# Patient Record
Sex: FEMALE | Race: WHITE | NOT HISPANIC OR LATINO | Employment: FULL TIME | URBAN - METROPOLITAN AREA
[De-identification: names, ages, dates, MRNs, and addresses within clinical notes are randomized per-mention and may not be internally consistent; named-entity substitution may affect disease eponyms.]

---

## 2020-08-21 ENCOUNTER — TRANSFERRED RECORDS (OUTPATIENT)
Dept: MULTI SPECIALTY CLINIC | Facility: CLINIC | Age: 45
End: 2020-08-21

## 2020-08-21 LAB
HPV ABSTRACT: NORMAL
PAP-ABSTRACT: NORMAL

## 2021-10-04 ENCOUNTER — ANCILLARY PROCEDURE (OUTPATIENT)
Dept: MAMMOGRAPHY | Facility: CLINIC | Age: 46
End: 2021-10-04
Attending: FAMILY MEDICINE
Payer: COMMERCIAL

## 2021-10-04 DIAGNOSIS — Z12.31 SCREENING MAMMOGRAM, ENCOUNTER FOR: ICD-10-CM

## 2021-10-04 PROCEDURE — 77063 BREAST TOMOSYNTHESIS BI: CPT | Mod: TC | Performed by: RADIOLOGY

## 2021-10-04 PROCEDURE — 77067 SCR MAMMO BI INCL CAD: CPT | Mod: TC | Performed by: RADIOLOGY

## 2021-10-08 ENCOUNTER — ANCILLARY PROCEDURE (OUTPATIENT)
Dept: MAMMOGRAPHY | Facility: CLINIC | Age: 46
End: 2021-10-08
Attending: FAMILY MEDICINE
Payer: COMMERCIAL

## 2021-10-08 DIAGNOSIS — N64.89 BREAST ASYMMETRY: ICD-10-CM

## 2021-10-08 PROCEDURE — 77065 DX MAMMO INCL CAD UNI: CPT | Mod: RT

## 2021-10-11 ENCOUNTER — ANCILLARY PROCEDURE (OUTPATIENT)
Dept: MAMMOGRAPHY | Facility: CLINIC | Age: 46
End: 2021-10-11
Attending: FAMILY MEDICINE
Payer: COMMERCIAL

## 2021-10-11 DIAGNOSIS — N64.89 BREAST ASYMMETRY: ICD-10-CM

## 2021-10-11 PROCEDURE — 272N000715 MA STEREOTACTIC BREAST BIOPSY VACUUM RT

## 2021-10-11 PROCEDURE — 250N000009 HC RX 250: Performed by: FAMILY MEDICINE

## 2021-10-11 PROCEDURE — 88342 IMHCHEM/IMCYTCHM 1ST ANTB: CPT | Mod: TC,XU | Performed by: FAMILY MEDICINE

## 2021-10-11 PROCEDURE — 88360 TUMOR IMMUNOHISTOCHEM/MANUAL: CPT | Mod: TC | Performed by: FAMILY MEDICINE

## 2021-10-11 RX ORDER — LIDOCAINE HYDROCHLORIDE AND EPINEPHRINE 10; 10 MG/ML; UG/ML
10 INJECTION, SOLUTION INFILTRATION; PERINEURAL ONCE
Status: COMPLETED | OUTPATIENT
Start: 2021-10-11 | End: 2021-10-11

## 2021-10-11 RX ADMIN — LIDOCAINE HYDROCHLORIDE,EPINEPHRINE BITARTRATE 10 ML: 10; .01 INJECTION, SOLUTION INFILTRATION; PERINEURAL at 14:23

## 2021-10-11 RX ADMIN — LIDOCAINE HYDROCHLORIDE 10 ML: 10 INJECTION, SOLUTION INFILTRATION; PERINEURAL at 14:22

## 2021-10-13 LAB
PATH REPORT.COMMENTS IMP SPEC: NORMAL
PATH REPORT.FINAL DX SPEC: NORMAL
PATH REPORT.GROSS SPEC: NORMAL
PATH REPORT.MICROSCOPIC SPEC OTHER STN: NORMAL
PATH REPORT.MICROSCOPIC SPEC OTHER STN: NORMAL
PATH REPORT.RELEVANT HX SPEC: NORMAL
PHOTO IMAGE: NORMAL

## 2021-10-13 PROCEDURE — 88342 IMHCHEM/IMCYTCHM 1ST ANTB: CPT | Mod: 26 | Performed by: PATHOLOGY

## 2021-10-13 PROCEDURE — 88360 TUMOR IMMUNOHISTOCHEM/MANUAL: CPT | Mod: 26 | Performed by: PATHOLOGY

## 2021-10-13 PROCEDURE — 88305 TISSUE EXAM BY PATHOLOGIST: CPT | Mod: 26 | Performed by: PATHOLOGY

## 2021-10-14 ENCOUNTER — TELEPHONE (OUTPATIENT)
Dept: MAMMOGRAPHY | Facility: CLINIC | Age: 46
End: 2021-10-14

## 2021-10-14 NOTE — TELEPHONE ENCOUNTER
Pathology report reviewed with our breast radiologist, Dr. Gallegos, who confirmed the recent breast imaging is concordant with the final pathology results below.    I phoned patient, confirmed her full name, date of birth, and informed patient of her benign breast biopsy results.     Recommended follow up per Radiologist is to return to routine breast screening.      Patient reports no problems or concerns with her biopsy site.     Questions were answered and my phone number given if she has further questions or concerns.  I informed patient I will notify the ordering provider of the results and recommendations for follow up.  Patient verbalized understanding and agrees with the plan of care.     Lizzy Man, RN, BSN  Breast Care Nurse Coordinator  [facility]  [phone]

## 2021-10-15 ENCOUNTER — TELEPHONE (OUTPATIENT)
Dept: FAMILY MEDICINE | Facility: CLINIC | Age: 46
End: 2021-10-15

## 2021-10-15 NOTE — TELEPHONE ENCOUNTER
----- Message from Nela Patel DO sent at 10/13/2021  1:41 PM CDT -----  Please call Marcela.   Her breast biopsy results have returned and show benign changes.  Nela Patel DO

## 2021-10-19 ENCOUNTER — OFFICE VISIT (OUTPATIENT)
Dept: FAMILY MEDICINE | Facility: CLINIC | Age: 46
End: 2021-10-19
Payer: COMMERCIAL

## 2021-10-19 VITALS
SYSTOLIC BLOOD PRESSURE: 127 MMHG | HEIGHT: 66 IN | BODY MASS INDEX: 22.86 KG/M2 | DIASTOLIC BLOOD PRESSURE: 73 MMHG | HEART RATE: 67 BPM | WEIGHT: 142.25 LBS

## 2021-10-19 DIAGNOSIS — M54.2 NECK PAIN: ICD-10-CM

## 2021-10-19 DIAGNOSIS — Z23 IMMUNIZATION DUE: ICD-10-CM

## 2021-10-19 DIAGNOSIS — Z76.89 ENCOUNTER TO ESTABLISH CARE: Primary | ICD-10-CM

## 2021-10-19 PROCEDURE — 90686 IIV4 VACC NO PRSV 0.5 ML IM: CPT | Performed by: FAMILY MEDICINE

## 2021-10-19 PROCEDURE — 90471 IMMUNIZATION ADMIN: CPT | Performed by: FAMILY MEDICINE

## 2021-10-19 PROCEDURE — 99213 OFFICE O/P EST LOW 20 MIN: CPT | Mod: 25 | Performed by: FAMILY MEDICINE

## 2021-10-19 ASSESSMENT — MIFFLIN-ST. JEOR: SCORE: 1300.96

## 2021-10-19 NOTE — PROGRESS NOTES
"    Assessment & Plan     1. Encounter to establish care  Reviewed health history and health maintenance recommendations.   She will check her home records for tetanus booster dates, may schedule nurse only visit if due or unable to find records.     2. Neck pain  Recommend tylenol, NSAIDS, warm bath/warm pad, gentle massage and stretching    3. Immunization due  - INFLUENZA VACCINE IM >6 MO VALENT IIV4 (ALFURIA/FLUZONE)     Return in about 1 year (around 10/19/2022) for Physical Exam.    Nela Patel DO  [facility]    Subjective   Marcela is a 46 year old who presents for the following health issues     Chief Complaint   Patient presents with     Establish Care      HPI   Marcela presents today to establish care. Recent change of insurance, needs new PCP.    Is remodeling house, was stressed, woke up with neck pain. Slightly decreased range of motion. Pain bilateral shoulders and radiating up into neck. No numbness or weakness. Symptoms are not radiating down arms. Hasn't taken anything for pain.    No other concerns. Healthy. Up to date on pap, mammo, and lipid screening.    Review of Systems   10 point ROS negative except for as reported above.       Objective    /73   Pulse 67   Ht 1.683 m (5' 6.25\")   Wt 64.5 kg (142 lb 4 oz)   LMP 10/12/2021 (Approximate)   Breastfeeding No   BMI 22.79 kg/m    Body mass index is 22.79 kg/m .  Physical Exam   GENERAL: Marcela is a pleasant, well appearing female, in no acute distress.  HEENT: Sclera white, no cervical lymphadenopathy  HEART: Regular rate and rhythm, no murmurs.  LUNGS: Clear to auscultation bilaterally, unlabored.  ABDOMEN: Soft, non-tender to palpation. No palpable masses.  EXTREMITIES: No lower extremity edema, pulses intact.         "

## 2021-10-19 NOTE — Clinical Note
Please abstract the following data from this visit with this patient into the appropriate field in Epic:    Tests that can be patient reported without a hard copy:    Pap smear done on this date: 8/21/20 (approximately), by this group: [facility], results were Negative.     Other Tests found in the patient's chart through Chart Review/Care Everywhere:    HPV done by this group [facility] on this date: Negative    Note to Abstraction: If this section is blank, no results were found via Chart Review/Care Everywhere.

## 2022-03-13 ENCOUNTER — HEALTH MAINTENANCE LETTER (OUTPATIENT)
Age: 47
End: 2022-03-13

## 2022-11-15 ASSESSMENT — ENCOUNTER SYMPTOMS
CHILLS: 0
HEARTBURN: 0
PALPITATIONS: 0
DIARRHEA: 0
PARESTHESIAS: 0
HEMATURIA: 0
FEVER: 0
BREAST MASS: 0
HEMATOCHEZIA: 0
EYE PAIN: 0
NERVOUS/ANXIOUS: 0
SORE THROAT: 0
ABDOMINAL PAIN: 0
DYSURIA: 0
FREQUENCY: 0
SHORTNESS OF BREATH: 0
DIZZINESS: 0
HEADACHES: 0
WEAKNESS: 0
CONSTIPATION: 0
COUGH: 0
ARTHRALGIAS: 0
JOINT SWELLING: 0
MYALGIAS: 0
NAUSEA: 0

## 2022-11-18 ENCOUNTER — OFFICE VISIT (OUTPATIENT)
Dept: FAMILY MEDICINE | Facility: CLINIC | Age: 47
End: 2022-11-18
Payer: COMMERCIAL

## 2022-11-18 VITALS
HEART RATE: 68 BPM | TEMPERATURE: 98.2 F | BODY MASS INDEX: 22.44 KG/M2 | OXYGEN SATURATION: 100 % | HEIGHT: 67 IN | DIASTOLIC BLOOD PRESSURE: 77 MMHG | WEIGHT: 143 LBS | SYSTOLIC BLOOD PRESSURE: 117 MMHG | RESPIRATION RATE: 16 BRPM

## 2022-11-18 DIAGNOSIS — Z71.89 ADVANCE DIRECTIVE DISCUSSED WITH PATIENT: ICD-10-CM

## 2022-11-18 DIAGNOSIS — Z00.00 ROUTINE HISTORY AND PHYSICAL EXAMINATION OF ADULT: Primary | ICD-10-CM

## 2022-11-18 DIAGNOSIS — Z23 NEED FOR DIPHTHERIA-TETANUS-PERTUSSIS (TDAP) VACCINE: ICD-10-CM

## 2022-11-18 DIAGNOSIS — Z23 NEEDS FLU SHOT: ICD-10-CM

## 2022-11-18 DIAGNOSIS — Z12.11 SCREEN FOR COLON CANCER: ICD-10-CM

## 2022-11-18 DIAGNOSIS — Z23 NEED FOR COVID-19 VACCINE: ICD-10-CM

## 2022-11-18 PROCEDURE — 91313 COVID-19,PF,MODERNA BIVALENT: CPT | Performed by: STUDENT IN AN ORGANIZED HEALTH CARE EDUCATION/TRAINING PROGRAM

## 2022-11-18 PROCEDURE — 0134A COVID-19,PF,MODERNA BIVALENT: CPT | Performed by: STUDENT IN AN ORGANIZED HEALTH CARE EDUCATION/TRAINING PROGRAM

## 2022-11-18 PROCEDURE — 99396 PREV VISIT EST AGE 40-64: CPT | Mod: 25 | Performed by: STUDENT IN AN ORGANIZED HEALTH CARE EDUCATION/TRAINING PROGRAM

## 2022-11-18 PROCEDURE — 90471 IMMUNIZATION ADMIN: CPT | Performed by: STUDENT IN AN ORGANIZED HEALTH CARE EDUCATION/TRAINING PROGRAM

## 2022-11-18 PROCEDURE — 90472 IMMUNIZATION ADMIN EACH ADD: CPT | Performed by: STUDENT IN AN ORGANIZED HEALTH CARE EDUCATION/TRAINING PROGRAM

## 2022-11-18 PROCEDURE — 90715 TDAP VACCINE 7 YRS/> IM: CPT | Performed by: STUDENT IN AN ORGANIZED HEALTH CARE EDUCATION/TRAINING PROGRAM

## 2022-11-18 PROCEDURE — 90682 RIV4 VACC RECOMBINANT DNA IM: CPT | Performed by: STUDENT IN AN ORGANIZED HEALTH CARE EDUCATION/TRAINING PROGRAM

## 2022-11-18 ASSESSMENT — ENCOUNTER SYMPTOMS
PARESTHESIAS: 0
HEARTBURN: 0
BREAST MASS: 0
SHORTNESS OF BREATH: 0
CHILLS: 0
HEMATOCHEZIA: 0
DIZZINESS: 0
PALPITATIONS: 0
JOINT SWELLING: 0
EYE PAIN: 0
ABDOMINAL PAIN: 0
MYALGIAS: 0
HEMATURIA: 0
WEAKNESS: 0
FEVER: 0
CONSTIPATION: 0
COUGH: 0
ARTHRALGIAS: 0
NAUSEA: 0
FREQUENCY: 0
HEADACHES: 0
SORE THROAT: 0
DYSURIA: 0
DIARRHEA: 0
NERVOUS/ANXIOUS: 0

## 2022-11-18 NOTE — PROGRESS NOTES
SUBJECTIVE:   CC: Marcela is an 47 year old who presents for preventive health visit.   Patient has been advised of split billing requirements and indicates understanding: Yes  Contraception  Pertinent negatives include no abdominal pain, arthralgias, chest pain, chills, congestion, coughing, fever, headaches, joint swelling, myalgias, nausea, rash, sore throat or weakness.   Healthy Habits:     Getting at least 3 servings of Calcium per day:  Yes    Bi-annual eye exam:  Yes    Dental care twice a year:  NO    Sleep apnea or symptoms of sleep apnea:  None    Diet:  Regular (no restrictions)    Frequency of exercise:  4-5 days/week    Duration of exercise:  30-45 minutes    Taking medications regularly:  Yes    Medication side effects:  None    PHQ-2 Total Score: 0    Additional concerns today:  Yes        Today's PHQ-2 Score:   PHQ-2 ( 1999 Pfizer) 11/15/2022   Q1: Little interest or pleasure in doing things 0   Q2: Feeling down, depressed or hopeless 0   PHQ-2 Score 0   PHQ-2 Total Score (12-17 Years)- Positive if 3 or more points; Administer PHQ-A if positive -   Q1: Little interest or pleasure in doing things Not at all   Q2: Feeling down, depressed or hopeless Not at all   PHQ-2 Score 0       Have you ever done Advance Care Planning? (For example, a Health Directive, POLST, or a discussion with a medical provider or your loved ones about your wishes): No, advance care planning information given to patient to review.  Advanced care planning was discussed at today's visit.    Social History     Tobacco Use     Smoking status: Never     Smokeless tobacco: Never   Substance Use Topics     Alcohol use: Yes     If you drink alcohol do you typically have >3 drinks per day or >7 drinks per week? Yes      Reviewed orders with patient.  Reviewed health maintenance and updated orders accordingly - Yes  Lab work is in process  Labs reviewed in EPIC    Breast Cancer Screening:    FHS-7:   Breast CA Risk Assessment  (FHS-7) 10/4/2021 10/8/2021 11/15/2022 11/23/2022   Did any of your first-degree relatives have breast or ovarian cancer? No No No No   Did any of your relatives have bilateral breast cancer? No No No No   Did any man in your family have breast cancer? No No No No   Did any woman in your family have breast and ovarian cancer? No No Yes No   Did any woman in your family have breast cancer before age 50 y? No No Yes No   Do you have 2 or more relatives with breast and/or ovarian cancer? No No Yes No   Do you have 2 or more relatives with breast and/or bowel cancer? No No No No       Pertinent mammograms are reviewed under the imaging tab.  Patient has mammogram scheduled for 11/23/2022.    History of abnormal Pap smear: NO - age 30-65 PAP every 5 years with negative HPV co-testing recommended  Most recent Pap 8/21/2020 negative with negative HPV cotesting.  Next due 8/21/2025.     Reviewed and updated as needed this visit by clinical staff    Allergies  Meds              Reviewed and updated as needed this visit by Provider                     Review of Systems   Constitutional: Negative for chills and fever.   HENT: Negative for congestion, ear pain, hearing loss and sore throat.    Eyes: Negative for pain and visual disturbance.   Respiratory: Negative for cough and shortness of breath.    Cardiovascular: Negative for chest pain, palpitations and peripheral edema.   Gastrointestinal: Negative for abdominal pain, constipation, diarrhea, heartburn, hematochezia and nausea.   Breasts:  Negative for tenderness, breast mass and discharge.   Genitourinary: Positive for vaginal discharge. Negative for dysuria, frequency, genital sores, hematuria, pelvic pain, urgency and vaginal bleeding.   Musculoskeletal: Negative for arthralgias, joint swelling and myalgias.   Skin: Negative for rash.   Neurological: Negative for dizziness, weakness, headaches and paresthesias.   Psychiatric/Behavioral: Negative for mood changes. The  "patient is not nervous/anxious.           OBJECTIVE:   /77 (BP Location: Left arm, Patient Position: Sitting, Cuff Size: Adult Regular)   Pulse 68   Temp 98.2  F (36.8  C) (Temporal)   Resp 16   Ht 1.702 m (5' 7\")   Wt 64.9 kg (143 lb)   LMP 11/02/2022   SpO2 100%   BMI 22.40 kg/m    Physical Exam  General Appearance:  Alert, cooperative, no distress, appears stated age.  Head:  Normocephalic, without obvious abnormality, atraumatic.  Eyes:  Conjunctivae/corneas clear, extraocular movements intact both eyes.  Lungs:  Clear to auscultation bilaterally, respirations unlabored.  Heart:  Regular rate and rhythm, S1 and S2 normal, no murmur, rub or gallop.  Abdomen:  Soft, non-tender, bowel sounds active all four quadrants.   Extremities:  Atraumatic, no cyanosis or edema.  Skin:  Skin color, texture, turgor normal, no rashes or lesions.  Neurologic: No focal deficits.      ASSESSMENT/PLAN:   Marcela was seen today for contraception and physical.    Diagnoses and all orders for this visit:    Routine history and physical examination of adult  -     REVIEW OF HEALTH MAINTENANCE PROTOCOL ORDERS    Screen for colon cancer  -     Colonoscopy Screening  Referral; Future    Need for diphtheria-tetanus-pertussis (Tdap) vaccine  -     TDAP VACCINE (Adacel, Boostrix)    Advance directive discussed with patient    Need for COVID-19 vaccine  -     COVID-19,PF,MODERNA BIVALENT (18+YRS)    Needs flu shot  -     [place] RIV4 (FLUBLOK) VACCINE RECOMBINANT DNA PRSRV ANTIBIO FREE, IM        Patient has been advised of split billing requirements and indicates understanding: Yes    Health Maintenance:    Depression screening: negative    STD Screening: declined    Contraception/folic acid (400-800mcg/day): provided info. Will think about it.     Cervical cancer screening (q3 years with cervical cytology alone 21-29. For women aged 30 to 65 years, sq3 years with cervical cytology alone, q5 years with high-risk human " papillomavirus (hrHPV) testing alone, or every 5 years with hrHPV testing in combination with cytology (cotesting): up to date. Last done 20 pap with cotest normal. Next Due 25.     Breast cancer screening ( biennial screening mammography for women aged 50 to 74 years): Positive family history as noted above. Declined genetic counseling.     Immunizations: flu and Tdap    HIV screening 15-65 (USPSTF guidelines, CDC guidelines are 13-64):    Hep C screening 18-78yo (USPSTF): declined    Advance Directive:  not on file. Information provided 2022.    Colorectal Cancer Screening (colonoscopy q10 years or q3 years 45-76yo)-  No family hx. Routine screening. Colonoscopy ordered.       COUNSELING:  Reviewed preventive health counseling, as reflected in patient instructions       Regular exercise       Healthy diet/nutrition       Vision screening       Hearing screening       Colorectal Cancer Screening       Consider Hep C screening for all patients one time for ages 18-79 years       HIV screeninx in teen years, 1x in adult years, and at intervals if high risk       Advance Care Planning        She reports that she has never smoked. She has never used smokeless tobacco.    Barbara Fuentes MD  [facility]

## 2022-11-23 ENCOUNTER — ANCILLARY PROCEDURE (OUTPATIENT)
Dept: MAMMOGRAPHY | Facility: CLINIC | Age: 47
End: 2022-11-23
Attending: FAMILY MEDICINE
Payer: COMMERCIAL

## 2022-11-23 DIAGNOSIS — Z12.31 VISIT FOR SCREENING MAMMOGRAM: ICD-10-CM

## 2022-11-23 PROCEDURE — 77067 SCR MAMMO BI INCL CAD: CPT

## 2022-12-23 ENCOUNTER — MYC MEDICAL ADVICE (OUTPATIENT)
Dept: FAMILY MEDICINE | Facility: CLINIC | Age: 47
End: 2022-12-23

## 2022-12-28 NOTE — TELEPHONE ENCOUNTER
The appt on the 5th was taken, offered the earlier time on the 6th and she will stick with the one on the 6th at the other clinic-[facility]

## 2023-01-06 ENCOUNTER — TELEPHONE (OUTPATIENT)
Dept: FAMILY MEDICINE | Facility: CLINIC | Age: 48
End: 2023-01-06

## 2023-01-06 ENCOUNTER — OFFICE VISIT (OUTPATIENT)
Dept: FAMILY MEDICINE | Facility: CLINIC | Age: 48
End: 2023-01-06
Payer: COMMERCIAL

## 2023-01-06 VITALS
DIASTOLIC BLOOD PRESSURE: 79 MMHG | SYSTOLIC BLOOD PRESSURE: 125 MMHG | HEART RATE: 74 BPM | RESPIRATION RATE: 16 BRPM | WEIGHT: 152.8 LBS | OXYGEN SATURATION: 98 % | BODY MASS INDEX: 23.98 KG/M2 | HEIGHT: 67 IN | TEMPERATURE: 96.8 F

## 2023-01-06 DIAGNOSIS — N84.1 CERVICAL POLYP: ICD-10-CM

## 2023-01-06 DIAGNOSIS — Z30.430 ENCOUNTER FOR IUD INSERTION: Primary | ICD-10-CM

## 2023-01-06 DIAGNOSIS — Z30.015 ENCOUNTER FOR INITIAL PRESCRIPTION OF VAGINAL RING HORMONAL CONTRACEPTIVE: Primary | ICD-10-CM

## 2023-01-06 DIAGNOSIS — Z12.11 SCREEN FOR COLON CANCER: ICD-10-CM

## 2023-01-06 DIAGNOSIS — Z30.015 ENCOUNTER FOR INITIAL PRESCRIPTION OF VAGINAL RING HORMONAL CONTRACEPTIVE: ICD-10-CM

## 2023-01-06 LAB — HCG UR QL: NEGATIVE

## 2023-01-06 PROCEDURE — 99213 OFFICE O/P EST LOW 20 MIN: CPT | Performed by: FAMILY MEDICINE

## 2023-01-06 PROCEDURE — 81025 URINE PREGNANCY TEST: CPT | Performed by: FAMILY MEDICINE

## 2023-01-06 RX ORDER — ETONOGESTREL AND ETHINYL ESTRADIOL VAGINAL RING .015; .12 MG/D; MG/D
1 RING VAGINAL
Qty: 3 EACH | Refills: 3 | Status: SHIPPED | OUTPATIENT
Start: 2023-01-06 | End: 2023-01-06

## 2023-01-06 NOTE — TELEPHONE ENCOUNTER
Medication Question or Refill        What medication are you calling about (include dose and sig)?: levonorgestrel (KYLEENA) 19.5 MG IUD    Controlled Substance Agreement on file:   CSA -- Patient Level:    CSA: None found at the patient level.       Who prescribed the medication?:     Do you need a refill? Yes:     When did you use the medication last? nope    Patient offered an appointment? Yes:     Do you have any questions or concerns?  No    Preferred Pharmacy: [facility] [id number] [place] [address] [address]  Phone: [phone] Fax: [phone]      Could we send this information to you in [employer] or would you prefer to receive a phone call?:   Patient would prefer a phone call   Okay to leave a detailed message?: Yes at Cell number on file:    Telephone Information:   Mobile [phone]

## 2023-01-06 NOTE — PROGRESS NOTES
"  Assessment & Plan       ICD-10-CM    1. Encounter for IUD insertion  Z30.430 HCG qualitative urine     HCG qualitative urine      2. Screen for colon cancer  Z12.11 Colonoscopy Screening  Referral      3. Cervical polyp  N84.1 Ob/Gyn Referral      4. Encounter for initial prescription of vaginal ring hormonal contraceptive  Z30.015         Medical decision making: Patient was seen today for an IUD insertion.  During exam, noted cervical polyp/lesion.  Therefore, referred to GYN for further management.  Discussed pros and cons of contraception.  Patient has a new partner: Planning to start relationship.  Discussed other options, patient has tolerated birth control pills in the past.  Patient is concerned about forgetting to take pills.  And I believe NuvaRing may be a good option for her.  She does not have any abnormal vaginal bleeding, heavy menstrual cycle or any other contraindication.      Start NuvaRing, this was sent to the pharmacy    - Continue other medications without change  See Patient Instructions    Return in about 10 months (around 11/6/2023) for Routine preventive.    Swapnil Carvalho MD  [facility]    Jami Medrano is a 47 year old, presenting for the following health issues:  Procedure (IUD Placement)      HPI     There is no problem list on file for this patient.    No current outpatient medications on file.     No current facility-administered medications for this visit.         Review of Systems   Constitutional, HEENT, cardiovascular, pulmonary, gi and gu systems are negative, except as otherwise noted.      Objective    /79 (BP Location: Left arm, Patient Position: Sitting, Cuff Size: Adult Regular)   Pulse 74   Temp 96.8  F (36  C) (Oral)   Resp 16   Ht 1.702 m (5' 7\")   Wt 69.3 kg (152 lb 12.8 oz)   LMP 01/06/2023   SpO2 98%   BMI 23.93 kg/m    Body mass index is 23.93 kg/m .  Physical Exam   GENERAL: healthy, alert and no " "distress    Results for orders placed or performed in visit on 23 (from the past 24 hour(s))   HCG qualitative urine   Result Value Ref Range    hCG Urine Qualitative Negative Negative                   IUD Insertion:  CONSULT:    Is a pregnancy test required: Yes.  Was it positive or negative?  Negative  Was a consent obtained?  Yes    Subjective: Marcela Suggs is a 47 year old  presents for IUD and desires Kyleena type IUD.    Patient has been given the opportunity to ask questions about all forms of birth control, including all options appropriate for Marcela Suggs. Discussed that no method of birth control, except abstinence is 100% effective against pregnancy or sexually transmitted infection.     Marcela Suggs understands she may have the IUD removed at any time. IUD should be removed by a health care provider.    The entire insertion procedure was reviewed with the patient, including care after placement.    Patient's last menstrual period was 2023. Last sexual activity: Many years ago. No allergy to betadine or shellfish. Patient declines STD screening  HCG Qual Urine   Date Value Ref Range Status   2009 Negative NEG Final     Comment:      Performed at Point of Care   This test provides a presumptive diagnosis of pregnancy or non-pregnancy. A   confirmed pregnancy diagnosis should only be made by a physician after all   clinical and laboratory findings have been evaluated.     hCG Urine Qualitative   Date Value Ref Range Status   2023 Negative Negative Final     Comment:     This test is for screening purposes.  Results should be interpreted along with the clinical picture.  Confirmation testing is available if warranted by ordering [id number], HCG Quantitative Pregnancy.         /79 (BP Location: Left arm, Patient Position: Sitting, Cuff Size: Adult Regular)   Pulse 74   Temp 96.8  F (36  C) (Oral)   Resp 16   Ht 1.702 m (5' 7\")   Wt 69.3 kg " (152 lb 12.8 oz)   LMP 01/06/2023   SpO2 98%   BMI 23.93 kg/m      Pelvic Exam:   EG/BUS: normal genital architecture without lesions, erythema or abnormal secretions.   Vagina: moist, pink, rugae with physiologic discharge and secretions  Cervix: Multi parous no lesions and pink, moist, noted polyp/lesion on the cervix.  Uterus retroflexed position, mobile, no pain  Adnexa: within normal limits and no masses, nodularity, tenderness    PROCEDURE NOTE: Noting the polyp- IUD Insertion was aborted            Swapnil Carvalho MD

## 2023-01-06 NOTE — TELEPHONE ENCOUNTER
Incoming call from pt who had a previous appt on the 1/6/23 with  and forgot to request new med's pt is asking for the med's to be sent into her pharmacy due to her leaving her appt without getting a prescription or having   Call it in to [facility] in [facility].

## 2023-01-06 NOTE — TELEPHONE ENCOUNTER
Reached out to original writer and will be addressed by team in clinic.    Shelly Berkowitz RN on 1/6/2023 at 4:37 PM

## 2023-01-06 NOTE — PATIENT INSTRUCTIONS
Cervical polyps  Vaginal bleeding - polyps    Cervical polyps are fingerlike growths on the lower part of the uterus that connects with the vagina (cervix).      Causes  The exact cause of cervical polyps is not known. They may occur with:    An abnormal response to increased levels of the female hormone estrogen  Chronic inflammation  Clogged blood vessels in the cervix  Cervical polyps are common. They are often found in women over age 40 who have had many children. Polyps are rare in young women who have not started having their period (menstruation).        Symptoms  Polyps do not always cause symptoms. When symptoms are present, they may include:    Very heavy menstrual periods  Vaginal bleeding after douching or intercourse  Abnormal vaginal bleeding after menopause or between periods  White or yellow mucus (leukorrhea)  Exams and Tests  Your health care provider will perform a pelvic exam. Some smooth, red or purple fingerlike growths will be seen on the cervix.    Most often, the provider will remove the polyp with a gentle tug and send it for testing. Most of the time, the biopsy will show cells that are consistent with a benign polyp. Rarely, there may be abnormal, precancerous, or cancer cells in a polyp.    Treatment  The provider can remove polyps during a simple outpatient procedure.    Smaller polyps may be removed with gentle twisting.  Electrocautery may be needed to remove larger polyps.  The removed polyp tissue should be sent to a lab for further tests.    [place] (Prognosis)  Most polyps are not cancerous (benign) and are easy to remove. Polyps do not grow back most of the time. Women who have polyps are at risk of growing more polyps.    Possible Complications  There may be bleeding and slight cramping for a few days after removal of a polyp. Some cervical cancers may first appear as a polyp. Certain uterine polyps may be associated with uterine cancer.     When to Contact a Medical  Professional  Contact your provider if you have:    Abnormal bleeding from the vagina, including bleeding after sex or between periods  Abnormal discharge from the vagina  Abnormally heavy periods  Bleeding or spotting after menopause  Contact your provider to schedule regular gynecological exams. Ask how often you should receive a Pap test.

## 2023-01-06 NOTE — TELEPHONE ENCOUNTER
Incoming call from patient calling to follow-up with rx.  States that she was just seen in clinic today with Dr. Carvalho. Had discussion with provider regarding a rx for vaginal ring. No rx on med list. RN unable to find notation of this on ov note from today. Med pended with preferred pharmacy. Will route to Dr. Carvalho to advise.    Patient would like a call back once provider sends in rx.    Leti Ford RN, BSN  [facility]

## 2023-01-09 RX ORDER — ETONOGESTREL AND ETHINYL ESTRADIOL VAGINAL RING .015; .12 MG/D; MG/D
1 RING VAGINAL
Qty: 3 EACH | Refills: 1 | Status: SHIPPED | OUTPATIENT
Start: 2023-01-09 | End: 2023-01-10

## 2023-01-10 ENCOUNTER — OFFICE VISIT (OUTPATIENT)
Dept: OBGYN | Facility: CLINIC | Age: 48
End: 2023-01-10
Attending: FAMILY MEDICINE
Payer: COMMERCIAL

## 2023-01-10 VITALS
OXYGEN SATURATION: 99 % | BODY MASS INDEX: 23.34 KG/M2 | DIASTOLIC BLOOD PRESSURE: 78 MMHG | SYSTOLIC BLOOD PRESSURE: 132 MMHG | HEART RATE: 54 BPM | WEIGHT: 149 LBS

## 2023-01-10 DIAGNOSIS — N85.2 BULKY OR ENLARGED UTERUS: ICD-10-CM

## 2023-01-10 DIAGNOSIS — N84.1 CERVICAL POLYP: Primary | ICD-10-CM

## 2023-01-10 PROCEDURE — 57500 BIOPSY OF CERVIX: CPT | Performed by: OBSTETRICS & GYNECOLOGY

## 2023-01-10 PROCEDURE — 99212 OFFICE O/P EST SF 10 MIN: CPT | Mod: 25 | Performed by: OBSTETRICS & GYNECOLOGY

## 2023-01-10 PROCEDURE — 88305 TISSUE EXAM BY PATHOLOGIST: CPT | Performed by: PATHOLOGY

## 2023-01-10 RX ORDER — ETONOGESTREL AND ETHINYL ESTRADIOL VAGINAL RING .015; .12 MG/D; MG/D
1 RING VAGINAL
COMMUNITY
End: 2023-01-11

## 2023-01-10 NOTE — PROGRESS NOTES
"CC: Marcela Suggs is here secondary to a cervical polyp.    HPI: The pt is a 47 year old MWF  who presents with a polyp seen at recent appointment for an IUD insertion.  She was also told she had a \"bulky\" uterus at that visit; ultrasound wasn't ordered; IUD wasn't placed.  She has not started the NuvaRing that was prescribed till she could get the polyp removed.  She is having her period now.    Past Medical History:   Diagnosis Date     Diverticulitis of colon        Past Surgical History:   Procedure Laterality Date     partial colectomy      diverticulitis       Patient's   Family History   Problem Relation Age of Onset     Breast Cancer Maternal Grandmother      Uterine Cancer Maternal Grandmother      Ovarian Cancer Paternal Grandmother      Ovarian Cancer Paternal Aunt      Colon Cancer No family hx of        Patient   Social History     Socioeconomic History     Marital status:      Spouse name: None     Number of children: 2     Years of education: None     Highest education level: None   Tobacco Use     Smoking status: Never     Smokeless tobacco: Never   Vaping Use     Vaping Use: Never used   Substance and Sexual Activity     Alcohol use: Yes     Drug use: Never     Sexual activity: Yes     Birth control/protection: Condom       Outpatient Medications Prior to Visit   Medication Sig Dispense Refill     etonogestrel-ethinyl estradiol (NUVARING) 0.12-0.015 MG/24HR vaginal ring Place 1 each vaginally every 28 days (Patient not taking: Reported on 1/10/2023)       etonogestrel-ethinyl estradiol (NUVARING) 0.12-0.015 MG/24HR vaginal ring Place 1 each vaginally every 28 days (Patient not taking: Reported on 1/10/2023) 3 each 1     No facility-administered medications prior to visit.       Patient has No Known Allergies.    ROS:  12 part ROS is negative aside from those symptoms in the HPI    PE:  /78 (BP Location: Right arm, Patient Position: Sitting, Cuff Size: Adult Regular)   " Pulse 54   Wt 67.6 kg (149 lb)   LMP 01/06/2023           Body mass index is 23.34 kg/m .    General: WN/WD WF, NAD  Pelvic: EG/BUS no lesions, no skin change   Vagina no lesions, no discharge   Cervix polyp at external os, grasped with ring forceps; torsion used to remove it   Perineum no lesions   Rectal deferred  Psych: normal mood  Neuro: CN I-XII grossly intact  MS: normal gait    Assessment: 47 year old MWF  with a cervical polyp.    Plan: Natural history of polyps discussed with the patient.  Ultrasound was ordered to evaluate for possible fibroids before IUD placement.  If the ultrasound doesn't preclude IUD, she will make an appointment with Dr Carvalho or myself for IUD placement.  We discussed Mirena vs Kyleena; since the Mirena has better cycle control and lasts longer, I recommended that over Kyleena for her.  Questions were answered to the best of my ability.

## 2023-01-11 DIAGNOSIS — Z30.015 ENCOUNTER FOR INITIAL PRESCRIPTION OF VAGINAL RING HORMONAL CONTRACEPTIVE: Primary | ICD-10-CM

## 2023-01-11 LAB
PATH REPORT.COMMENTS IMP SPEC: NORMAL
PATH REPORT.COMMENTS IMP SPEC: NORMAL
PATH REPORT.FINAL DX SPEC: NORMAL
PATH REPORT.GROSS SPEC: NORMAL
PATH REPORT.MICROSCOPIC SPEC OTHER STN: NORMAL
PATH REPORT.RELEVANT HX SPEC: NORMAL
PHOTO IMAGE: NORMAL

## 2023-01-11 RX ORDER — ETONOGESTREL AND ETHINYL ESTRADIOL VAGINAL RING .015; .12 MG/D; MG/D
1 RING VAGINAL
Qty: 3 EACH | Refills: 1 | Status: SHIPPED | OUTPATIENT
Start: 2023-01-11 | End: 2023-01-19

## 2023-01-12 ENCOUNTER — HOSPITAL ENCOUNTER (OUTPATIENT)
Dept: ULTRASOUND IMAGING | Facility: HOSPITAL | Age: 48
Discharge: HOME | End: 2023-01-12
Attending: OBSTETRICS & GYNECOLOGY | Admitting: OBSTETRICS & GYNECOLOGY
Payer: COMMERCIAL

## 2023-01-12 DIAGNOSIS — N85.2 BULKY OR ENLARGED UTERUS: ICD-10-CM

## 2023-01-12 PROCEDURE — 76856 US EXAM PELVIC COMPLETE: CPT

## 2023-01-19 ENCOUNTER — OFFICE VISIT (OUTPATIENT)
Dept: FAMILY MEDICINE | Facility: CLINIC | Age: 48
End: 2023-01-19
Payer: COMMERCIAL

## 2023-01-19 VITALS
SYSTOLIC BLOOD PRESSURE: 139 MMHG | HEIGHT: 67 IN | DIASTOLIC BLOOD PRESSURE: 79 MMHG | RESPIRATION RATE: 16 BRPM | WEIGHT: 149 LBS | OXYGEN SATURATION: 100 % | HEART RATE: 62 BPM | BODY MASS INDEX: 23.39 KG/M2

## 2023-01-19 DIAGNOSIS — Z30.430 ENCOUNTER FOR IUD INSERTION: Primary | ICD-10-CM

## 2023-01-19 LAB — HCG UR QL: NEGATIVE

## 2023-01-19 PROCEDURE — 81025 URINE PREGNANCY TEST: CPT | Performed by: FAMILY MEDICINE

## 2023-01-19 PROCEDURE — 99207 PR DROP WITH A PROCEDURE: CPT | Performed by: FAMILY MEDICINE

## 2023-01-19 PROCEDURE — 58300 INSERT INTRAUTERINE DEVICE: CPT | Performed by: FAMILY MEDICINE

## 2023-01-19 NOTE — PROGRESS NOTES
"  Assessment & Plan     ICD-10-CM    1. Encounter for IUD insertion  Z30.430 HCG qualitative urine     HCG qualitative urine     levonorgestrel (MIRENA) 20 MCG/DAY IUD 20 mcg        -Successful IUD Insertion  -Advised to call for any fever or for prolonged or severe pain or bleeding.   -Advised to use OTC analgesics and heat as needed for mild to moderate discomfort.   -Warning signs (P-A-I-N-S) with use of IUD reviewed. RTC in 4-6 weeks for routine IUD check appointment.  -Recommended condom use o rabstinence for 1 week. Protection from pregnancy will begin after 7 days of IUD placement. Condoms always for STI prevention.  -IUD should be removed in 5 years     MEDICATIONS:  Continue current medications without change  See Patient Instructions    Return in about 4 weeks (around 2/16/2023) for Follow up.    Swapnil Carvalho MD  [facility]    Jami Medrano is a 47 year old accompanied by her N/A, presenting for the following health issues:  IUD (IUD insert, pt decided on mierana. )      IUD         There is no problem list on file for this patient.    No current outpatient medications on file.     Current Facility-Administered Medications   Medication     levonorgestrel (MIRENA) 20 MCG/DAY IUD 20 mcg       Review of Systems   Constitutional, HEENT, cardiovascular, pulmonary, gi and gu systems are negative, except as otherwise noted.      Objective    /79 (BP Location: Left arm, Patient Position: Sitting, Cuff Size: Adult Regular)   Pulse 62   Resp 16   Ht 1.702 m (5' 7\")   Wt 67.6 kg (149 lb)   LMP 01/06/2023   SpO2 100%   BMI 23.34 kg/m    Body mass index is 23.34 kg/m .  Physical Exam   GENERAL: healthy, alert and no distress  ABDOMEN: soft, nontender  MS: no gross musculoskeletal defects noted, no edema    Results for orders placed or performed in visit on 01/19/23 (from the past 24 hour(s))   HCG qualitative urine   Result Value Ref Range    hCG Urine Qualitative " Negative Negative       IUD Insertion Procedure Note     Pre-operative Diagnosis: Kyleena IUD insertion for birth control     Post-operative Diagnosis: same     Indications: contraception     Procedure Details   Again reviewed IUD indications. The following benefits, risk/side effects, warning signs, discontinue use option,regarding the birth control method were explained to her before she voluntarily decided to use this method of birth control.     BENEFITS: The IUD is 97-99% effective if all the directionsregarding its use are followed carfeully. IUDs containing progestin may decrease menstrual flow and painful menstrual periods.      Bimanual exam performed to determine retroflexed position of uterus. Sterile speculum wasplaced. Cervix was cleansed with Betadine and sterile technique was maintained throughout the procedure. The tenaculum was applied to the anterior lip of the cervix and gentle traction applied to straighten andstabilize. Uterus sounded to 7.5 cm.  Mirena was inserted.  Strings visible and trimmed to 2-3 cm. Patient tolerated procedure well. Bleeding was minimal.     IUD Information:  Mirena Lot #[id number]  expiration date  October 2024  NDC 08973-380-32     Condition:  Stable     Complications:  None

## 2023-01-19 NOTE — PATIENT INSTRUCTIONS
RISKS/SIDE EFFECTS  Spotting, bleeding, hemorrhage or anemia  Cramping or pain  Partial or complete expulsion of device leading to pregnancy, thepregnancy ending in miscarriage  Lost IUD string or other string problems  Puncturing of the uterus, embedding, or cervical perforation  Increased risk of pelvic inflammatory disease  WARNING SIGNS: The patient was advised  to call the clinic if she has any of the following early warning signs:  P - Period late (pregnancy), abnormal spotting or bleeding  A - Abdominal pain, pain with intercourse  I - Infection exposure (such as gonorrhea), abnormal discharge  N - Not feeling well, fever, chills  S - String missing, shorter or longer

## 2023-01-30 ENCOUNTER — TELEPHONE (OUTPATIENT)
Dept: GASTROENTEROLOGY | Facility: CLINIC | Age: 48
End: 2023-01-30
Payer: COMMERCIAL

## 2023-01-30 NOTE — TELEPHONE ENCOUNTER
"    Screening Questions  BLUE  KIND OF PREP RED  LOCATION [review exclusion criteria] GREEN  SEDATION TYPE        Y Are you active on mychart?       Swapnil Carvalho MD   Ordering/Referring Provider?        PO What type of coverage do you have?      N Have you had a positive covid test in the last 14 days?     23.5 1. BMI  [BMI 40+ - review exclusion criteria]    Y  2. Are you able to give consent for your medical care? [IF NO,RN REVIEW]          N  3. Are you taking any prescription pain medications on a routine schedule   (ex narcotics: tramadol, oxycodone, roxicodone, oxycontin,  and percocet)?          3a. EXTENDED PREP What kind of prescription?     N 4. Do you have any chemical dependencies such as alcohol, street drugs, or methadone?        **If yes 3- 5 , please schedule with MAC sedation.**          IF YES TO ANY 6 - 10 - HOSPITAL SETTING ONLY.     N 6.   Do you need assistance transferring?     N 7.   Have you had a heart or lung transplant?    N 8.   Are you currently on dialysis?   N 9.   Do you use daily home oxygen?   N 10. Do you take nitroglycerin?   10a.  If yes, how often?     11. [FEMALES]  N Are you currently pregnant?    11a.  If yes, how many weeks? [ Greater than 12 weeks, OR NEEDED]    N 12. Do you have Pulmonary Hypertension? *NEED PAC APPT AT [facility]*     N 13. [review exclusion criteria]  Do you have any implantable devices in your body (pacemaker, defib, LVAD)?    N 14. In the past 6 months, have you had any heart related issues including cardiomyopathy or heart attack?     14a.  If yes, did it require cardiac stenting if so when?     N 15. Have you had a stroke or Transient ischemic attack (TIA - aka  mini stroke ) within 6 months?      N 16. Do you have mod to severe Obstructive Sleep Apnea?  [Hospital only]    N 17. Do you have SEVERE AND UNCONTROLLED asthma? *NEED PAC APPT AT [facility]*     N 18. Are you currently taking any blood thinners?     18a. If yes, inform patient to \"follow up w/ " "ordering provider for bridging instructions.\"    N 19. Do you take the medication Phentermine?    19a. If yes, \"Hold for 7 days before procedure.  Please consult your prescribing provider if you have questions about holding this medication.\"     N  20. Do you have chronic kidney disease?      N  21. Do you have a diagnosis of diabetes?     N  22. On a regular basis do you go 3-5 days between bowel movements?      23. Preferred LOCAL Pharmacy for Pre Prescription    [ LIST ONLY ONE PHARMACY]        [facility] [address] [place] [address]      - CLOSING REMINDERS -    Informed patient they will need an adult    Cannot take any type of public or medical transportation alone    Conscious Sedation- Needs  for 6 hours after the procedure       MAC/General-Needs  for 24 hours after procedure    Pre-Procedure Covid test to be completed [[facility] PCR Testing Required]    Confirmed Nurse will call to complete assessment       - SCHEDULING DETAILS -  NO Hospital Setting Required? If yes, what is the exclusion?:    JOSEPH  Surgeon    2/15  Date of Procedure  Lower Endoscopy [Colonoscopy]  Type of Procedure Scheduled  [facility] Surgery [facility]-If you answer yes to questions #8, #20, #21Which Colonoscopy Prep was Sent?     MAC, PER LOCATION Sedation Type     N PAC / Pre-op Required                 "

## 2023-02-08 DIAGNOSIS — Z12.11 ENCOUNTER FOR SCREENING COLONOSCOPY: Primary | ICD-10-CM

## 2023-02-08 RX ORDER — BISACODYL 5 MG/1
TABLET, DELAYED RELEASE ORAL
Qty: 4 TABLET | Refills: 0 | Status: SHIPPED | OUTPATIENT
Start: 2023-02-08

## 2023-02-14 ENCOUNTER — ANESTHESIA EVENT (OUTPATIENT)
Dept: SURGERY | Facility: AMBULATORY SURGERY CENTER | Age: 48
End: 2023-02-14
Payer: COMMERCIAL

## 2023-02-15 ENCOUNTER — ANESTHESIA (OUTPATIENT)
Dept: SURGERY | Facility: AMBULATORY SURGERY CENTER | Age: 48
End: 2023-02-15
Payer: COMMERCIAL

## 2023-02-15 ENCOUNTER — HOSPITAL ENCOUNTER (OUTPATIENT)
Facility: AMBULATORY SURGERY CENTER | Age: 48
Discharge: HOME | End: 2023-02-15
Attending: SURGERY
Payer: COMMERCIAL

## 2023-02-15 VITALS
HEART RATE: 78 BPM | DIASTOLIC BLOOD PRESSURE: 64 MMHG | RESPIRATION RATE: 16 BRPM | TEMPERATURE: 98.1 F | SYSTOLIC BLOOD PRESSURE: 114 MMHG | OXYGEN SATURATION: 98 %

## 2023-02-15 LAB — COLONOSCOPY: NORMAL

## 2023-02-15 RX ORDER — LIDOCAINE HYDROCHLORIDE 20 MG/ML
INJECTION, SOLUTION INFILTRATION; PERINEURAL PRN
Status: DISCONTINUED | OUTPATIENT
Start: 2023-02-15 | End: 2023-02-15

## 2023-02-15 RX ORDER — HYDROMORPHONE HCL IN WATER/PF 6 MG/30 ML
0.4 PATIENT CONTROLLED ANALGESIA SYRINGE INTRAVENOUS EVERY 5 MIN PRN
Status: DISCONTINUED | OUTPATIENT
Start: 2023-02-15 | End: 2023-02-16 | Stop reason: HOSPADM

## 2023-02-15 RX ORDER — OXYCODONE HYDROCHLORIDE 5 MG/1
5 TABLET ORAL EVERY 4 HOURS PRN
Status: DISCONTINUED | OUTPATIENT
Start: 2023-02-15 | End: 2023-02-16 | Stop reason: HOSPADM

## 2023-02-15 RX ORDER — ACETAMINOPHEN 325 MG/1
975 TABLET ORAL ONCE
Status: DISCONTINUED | OUTPATIENT
Start: 2023-02-15 | End: 2023-02-16 | Stop reason: HOSPADM

## 2023-02-15 RX ORDER — LIDOCAINE 40 MG/G
CREAM TOPICAL
Status: DISCONTINUED | OUTPATIENT
Start: 2023-02-15 | End: 2023-02-16 | Stop reason: HOSPADM

## 2023-02-15 RX ORDER — PROPOFOL 10 MG/ML
INJECTION, EMULSION INTRAVENOUS PRN
Status: DISCONTINUED | OUTPATIENT
Start: 2023-02-15 | End: 2023-02-15

## 2023-02-15 RX ORDER — SODIUM CHLORIDE, SODIUM LACTATE, POTASSIUM CHLORIDE, CALCIUM CHLORIDE 600; 310; 30; 20 MG/100ML; MG/100ML; MG/100ML; MG/100ML
INJECTION, SOLUTION INTRAVENOUS CONTINUOUS
Status: DISCONTINUED | OUTPATIENT
Start: 2023-02-15 | End: 2023-02-16 | Stop reason: HOSPADM

## 2023-02-15 RX ORDER — ONDANSETRON 2 MG/ML
4 INJECTION INTRAMUSCULAR; INTRAVENOUS EVERY 30 MIN PRN
Status: DISCONTINUED | OUTPATIENT
Start: 2023-02-15 | End: 2023-02-16 | Stop reason: HOSPADM

## 2023-02-15 RX ORDER — HYDROMORPHONE HCL IN WATER/PF 6 MG/30 ML
0.2 PATIENT CONTROLLED ANALGESIA SYRINGE INTRAVENOUS EVERY 5 MIN PRN
Status: DISCONTINUED | OUTPATIENT
Start: 2023-02-15 | End: 2023-02-16 | Stop reason: HOSPADM

## 2023-02-15 RX ORDER — PROPOFOL 10 MG/ML
INJECTION, EMULSION INTRAVENOUS CONTINUOUS PRN
Status: DISCONTINUED | OUTPATIENT
Start: 2023-02-15 | End: 2023-02-15

## 2023-02-15 RX ORDER — FENTANYL CITRATE 0.05 MG/ML
25 INJECTION, SOLUTION INTRAMUSCULAR; INTRAVENOUS
Status: DISCONTINUED | OUTPATIENT
Start: 2023-02-15 | End: 2023-02-16 | Stop reason: HOSPADM

## 2023-02-15 RX ORDER — OXYCODONE HYDROCHLORIDE 10 MG/1
10 TABLET ORAL EVERY 4 HOURS PRN
Status: DISCONTINUED | OUTPATIENT
Start: 2023-02-15 | End: 2023-02-16 | Stop reason: HOSPADM

## 2023-02-15 RX ORDER — ONDANSETRON 4 MG/1
4 TABLET, ORALLY DISINTEGRATING ORAL EVERY 30 MIN PRN
Status: DISCONTINUED | OUTPATIENT
Start: 2023-02-15 | End: 2023-02-16 | Stop reason: HOSPADM

## 2023-02-15 RX ORDER — FENTANYL CITRATE 0.05 MG/ML
25 INJECTION, SOLUTION INTRAMUSCULAR; INTRAVENOUS EVERY 5 MIN PRN
Status: DISCONTINUED | OUTPATIENT
Start: 2023-02-15 | End: 2023-02-16 | Stop reason: HOSPADM

## 2023-02-15 RX ORDER — FENTANYL CITRATE 0.05 MG/ML
50 INJECTION, SOLUTION INTRAMUSCULAR; INTRAVENOUS EVERY 5 MIN PRN
Status: DISCONTINUED | OUTPATIENT
Start: 2023-02-15 | End: 2023-02-16 | Stop reason: HOSPADM

## 2023-02-15 RX ORDER — GLYCOPYRROLATE 0.2 MG/ML
INJECTION, SOLUTION INTRAMUSCULAR; INTRAVENOUS PRN
Status: DISCONTINUED | OUTPATIENT
Start: 2023-02-15 | End: 2023-02-15

## 2023-02-15 RX ADMIN — LIDOCAINE HYDROCHLORIDE 30 MG: 20 INJECTION, SOLUTION INFILTRATION; PERINEURAL at 08:12

## 2023-02-15 RX ADMIN — PROPOFOL 30 MG: 10 INJECTION, EMULSION INTRAVENOUS at 08:14

## 2023-02-15 RX ADMIN — PROPOFOL 200 MCG/KG/MIN: 10 INJECTION, EMULSION INTRAVENOUS at 08:14

## 2023-02-15 RX ADMIN — GLYCOPYRROLATE 0.2 MG: 0.2 INJECTION, SOLUTION INTRAMUSCULAR; INTRAVENOUS at 08:12

## 2023-02-15 RX ADMIN — SODIUM CHLORIDE, SODIUM LACTATE, POTASSIUM CHLORIDE, CALCIUM CHLORIDE: 600; 310; 30; 20 INJECTION, SOLUTION INTRAVENOUS at 08:12

## 2023-02-15 NOTE — ANESTHESIA POSTPROCEDURE EVALUATION
Patient: Marcela Suggs    Procedure: Procedure(s):  COLONOSCOPY       Anesthesia Type:  MAC    Note:  Disposition: Outpatient   Postop Pain Control: Uneventful            Sign Out: Well controlled pain   PONV: No   Neuro/Psych: Uneventful            Sign Out: Acceptable/Baseline neuro status   Airway/Respiratory: Uneventful            Sign Out: Acceptable/Baseline resp. status   CV/Hemodynamics: Uneventful            Sign Out: Acceptable CV status; No obvious hypovolemia; No obvious fluid overload   Other NRE: NONE   DID A NON-ROUTINE EVENT OCCUR? No           Last vitals:  Vitals Value Taken Time   /64 02/15/23 0900   Temp 98.1  F (36.7  C) 02/15/23 0844   Pulse 78 02/15/23 0900   Resp 16 02/15/23 0850   SpO2 98 % 02/15/23 0900   Vitals shown include unvalidated device data.    Electronically Signed By: Ha Lockhart MD  February 15, 2023  9:03 AM

## 2023-02-15 NOTE — DISCHARGE INSTRUCTIONS
Colonoscopy  Colonoscopy is a test to view the inside of your lower digestive tract (colon and rectum). Sometimes it can show the last part of the small intestine (ileum). During the test, small pieces of tissue may be removed for testing. This is called a biopsy. Small growths, such as polyps, may also be removed.       A camera attached to a flexible tube with a viewing lens is used to take video pictures.   Why is colonoscopy done?  The test is done to help look for colon cancer. And it can help find the source of abdominal pain, bleeding, and changes in bowel habits. It may be needed once a year to every 10 years, depending on factors such as your:   Age  Health history  Family health history  Symptoms  Results from any prior colonoscopy  Risks and possible complications  These include:  Bleeding               A puncture or tear in the colon   Risks of anesthesia  A cancer lesion not being seen or fully removed  Getting ready   To prepare for the test:  Talk with your healthcare provider about the risks of the test (see below). Also ask your healthcare provider about alternatives to the test.  Tell your healthcare provider about any medicines and supplements you take. Also tell him or her about any health conditions you may have.  Make sure your rectum and colon are empty for the test. Follow the diet and bowel prep instructions exactly. If you don t, the test may need to be rescheduled.  Plan for a friend or family member to drive you home after the test.    You may discuss the results with your doctor right away or at a future visit.   During the test   The test is usually done in the hospital on an outpatient basis or at an outpatient clinic. This means you go home the same day. The procedure takes about 30 minutes. During that time:   You are given relaxing (sedating) medicine through an IV line. You may be drowsy, or fully asleep.  The healthcare provider will first give you a physical exam to check for  anal and rectal problems.  Then the anus is lubricated and the scope inserted.  If you are awake, you may have a feeling similar to needing to have a bowel movement. You may also feel pressure as air is pumped into the colon. It s OK to pass gas during the procedure.  Biopsy, polyp removal, or other treatments may be done during the test.     Colonoscopy provides an inside view of the entire colon.   After the test   You may have gas right after the test. It can help to try to pass it to help prevent later bloating. Your healthcare provider may discuss the results with you right away. Or you may need to schedule a follow-up visit to talk about the results. After the test, you can go back to your normal eating and other activities. You may be tired from the sedation and need to rest for a few hours. Discuss your medicines with your provider to understand if they can be restarted right away.   When to call your healthcare provider  Call your healthcare provider if you have any of the following after the procedure:   Pain in your belly  Fever of 100.4 F (38 C) or higher, or as directed by your provider  Rectal bleeding  Nausea or vomiting  [employer] last reviewed this educational content on 6/1/2019 2000-2021 [employer]. All rights reserved. This information is not intended as a substitute for professional medical care. Always follow your healthcare professional's instructions.    Adult Discharge Orders & Instructions     For 24 hours after surgery    Get plenty of rest.  A responsible adult must stay with you for at least 24 hours after you leave the hospital.   Do not drive or use heavy equipment.  If you have weakness or tingling, don't drive or use heavy equipment until this feeling goes away.  Do not drink alcohol.  Avoid strenuous or risky activities.  Ask for help when climbing stairs.   You may feel lightheaded.  IF so, sit for a few minutes before standing.  Have someone help you get up.   If  you have nausea (feel sick to your stomach): Drink only clear liquids such as apple juice, ginger ale, broth or 7-Up.  Rest may also help.  Be sure to drink enough fluids.  Move to a regular diet as you feel able.  You may have a slight fever. Call the doctor if your fever is over 100 F (37.7 C) (taken under the tongue) or lasts longer than 24 hours.  You may have a dry mouth, a sore throat, muscle aches or trouble sleeping.  These should go away after 24 hours.  Do not make important or legal decisions.

## 2023-02-15 NOTE — ANESTHESIA CARE TRANSFER NOTE
Patient: Marcela Suggs    Procedure: Procedure(s):  COLONOSCOPY       Diagnosis: Screen for colon cancer [Z12.11]  Diagnosis Additional Information: No value filed.    Anesthesia Type:   MAC     Note:    Oropharynx: oropharynx clear of all foreign objects  Level of Consciousness: drowsy  Oxygen Supplementation: room air    Independent Airway: airway patency satisfactory and stable  Dentition: dentition unchanged  Vital Signs Stable: post-procedure vital signs reviewed and stable  Report to RN Given: handoff report given  Patient transferred to: Phase II    Handoff Report: Identifed the Patient, Identified the Reponsible Provider, Reviewed the pertinent medical history, Discussed the surgical course, Reviewed Intra-OP anesthesia mangement and issues during anesthesia, Set expectations for post-procedure period and Allowed opportunity for questions and acknowledgement of understanding      Vitals:  Vitals Value Taken Time   BP 88/51 02/15/23 0844   Temp 36.7  C (98.1  F) 02/15/23 0844   Pulse 85 02/15/23 0844   Resp 16 02/15/23 0844   SpO2 98 % 02/15/23 0844       Electronically Signed By: JACK Joya CRNA  February 15, 2023  8:46 AM

## 2023-02-15 NOTE — ANESTHESIA PREPROCEDURE EVALUATION
Anesthesia Pre-Procedure Evaluation    Patient: Marcela Suggs   MRN: [id number] : 1975        Procedure : Procedure(s):  COLONOSCOPY          Past Medical History:   Diagnosis Date     Diverticulitis of colon       Past Surgical History:   Procedure Laterality Date     partial colectomy      diverticulitis      No Known Allergies   Social History     Tobacco Use     Smoking status: Never     Smokeless tobacco: Never   Substance Use Topics     Alcohol use: Yes      Wt Readings from Last 1 Encounters:   23 67.6 kg (149 lb)        Anesthesia Evaluation   Pt has had prior anesthetic. Type: General.    No history of anesthetic complications       ROS/MED HX  ENT/Pulmonary:  - neg pulmonary ROS     Neurologic:  - neg neurologic ROS     Cardiovascular:  - neg cardiovascular ROS     METS/Exercise Tolerance: >4 METS    Hematologic:  - neg hematologic  ROS     Musculoskeletal:  - neg musculoskeletal ROS     GI/Hepatic: Comment: Diverticulitis s/p partial colectomy - neg GI/hepatic ROS     Renal/Genitourinary:  - neg Renal ROS     Endo:  - neg endo ROS     Psychiatric/Substance Use:  - neg psychiatric ROS     Infectious Disease:  - neg infectious disease ROS     Malignancy:  - neg malignancy ROS     Other:  - neg other ROS          Physical Exam    Airway        Mallampati: II   TM distance: > 3 FB   Neck ROM: full   Mouth opening: > 3 cm    Respiratory Devices and Support         Dental       (+) Minor Abnormalities - some fillings, tiny chips      Cardiovascular   cardiovascular exam normal          Pulmonary   pulmonary exam normal                OUTSIDE LABS:  CBC:   Lab Results   Component Value Date    HGB 10.2 (L) 2011    HGB 12.5 2011     BMP: No results found for: NA, POTASSIUM, CHLORIDE, CO2, BUN, CR, GLC  COAGS: No results found for: PTT, INR, FIBR  POC:   Lab Results   Component Value Date    HCG Negative 2023     HEPATIC: No results found for: ALBUMIN, PROTTOTAL, ALT, AST,  GGT, ALKPHOS, BILITOTAL, BILIDIRECT, SHIVANI  OTHER: No results found for: PH, LACT, A1C, VAMSI, PHOS, MAG, LIPASE, AMYLASE, TSH, T4, T3, CRP, SED    Anesthesia Plan    ASA Status:  1   NPO Status:  NPO Appropriate    Anesthesia Type: MAC.     - Reason for MAC: immobility needed, straight local not clinically adequate   Induction: Propofol.   Maintenance: TIVA.        Consents    Anesthesia Plan(s) and associated risks, benefits, and realistic alternatives discussed. Questions answered and patient/representative(s) expressed understanding.    - Discussed:     - Discussed with:  Patient         Postoperative Care    Pain management: Multi-modal analgesia.   PONV prophylaxis: Ondansetron (or other 5HT-3), Dexamethasone or Solumedrol     Comments:    Other Comments: Propofol     Reviewed anesthetic options and risks. Patient agrees to proceed.             Ha Lockhart MD

## 2023-02-15 NOTE — H&P
General Surgery H&P  Marcela Suggs MRN# [id number]   Age/Sex: 47 year old female YOB: 1975     Reason for visit: Colonoscopy       Referring physician: Swapnil Carvalho MD                   Assessment and Plan:   Assessment:  1. Colonoscopy  2. Hx of diverticulitis s/p sigmoid resection approximately 20 years ago    Plan:  -To the OR for colonoscopy  -Risk and benefits of procedure explained detail to the patient.  Risks include infection, bleeding, damage to the surrounding structures and possible perforation of the hollow organs.  Patient verbalized understanding provided consent to undergo the procedure above.          Chief Complaint:   Presenting for colonoscopy     History is obtained from the patient    HPI:   Marcela Suggs is a 47 year old female who presents for colonoscopy.  Patient tolerated the prep well.  Family history shows no history of colon cancer.  Patient is noted to have history of partial colon resection of the sigmoid with diverticulitis approximately 20 years ago in [place].  Patient's never had a colonoscopy after that procedure.  No new complaints.           Past Medical History:     Past Medical History:   Diagnosis Date     Diverticulitis of colon               Past Surgical History:     Past Surgical History:   Procedure Laterality Date     partial colectomy      diverticulitis             Social History:    reports that she has never smoked. She has never used smokeless tobacco. She reports current alcohol use. She reports that she does not use drugs.           Family History:     Family History   Problem Relation Age of Onset     Breast Cancer Maternal Grandmother      Uterine Cancer Maternal Grandmother      Ovarian Cancer Paternal Grandmother      Ovarian Cancer Paternal Aunt      Colon Cancer No family hx of               Allergies:   No Known Allergies           Medications:     Prior to Admission medications    Medication Sig Start Date End Date  Taking? Authorizing Provider   bisacodyl (DULCOLAX) 5 MG EC tablet Take 2 tablets at 3 pm the day before your procedure. If your procedure is before 11 am, take 2 additional tablets at 11 pm. If your procedure is after 11 am, take 2 additional tablets at 6 am. For additional instructions refer to your colonoscopy prep instructions. 2/8/23   Perfecto Portillo DO   polyethylene glycol (GOLYTELY) 236 g suspension The night before the exam at 6 pm drink an 8-ounce glass every 15 minutes until the jug is half empty. If you arrive before 11 AM: Drink the other half of the Golytely jug at 11 PM night before procedure. If you arrive after 11 AM: Drink the other half of the Golytely jug at 6 AM day of procedure. For additional instructions refer to your colonoscopy prep instructions. 2/8/23   ePrfecto Portillo DO              Review of Systems:   A 12 point Review of Systems is negative other than noted in the HPI            Physical Exam:   No data found.     No intake or output data in the 24 hours ending 02/15/23 0738   Constitutional:   awake, alert, cooperative, no apparent distress, and appears stated age       Eyes:   PERRL, conjunctiva/corneas clear, EOM's intact; no scleral edema or icterus noted        ENT:   Normocephalic, without obvious abnormality, atraumatic, Lips, mucosa, and tongue normal        Hematologic / Lymphatic:   No lymphadenopathy       Lungs:   Normal respiratory effort, no accessory muscle use, breath sounds bilaterally on auscultation       Cardiovascular:   Regular rate and rhythm       Abdomen:   Soft, nondistended, nontender to palpation       Musculoskeletal:   No obvious swelling, bruising or deformity       Skin:   Skin color and texture normal for patient, no rashes or lesions              Data:          DO Perfecto Glass DO  General Surgeon  [address]  [place] [address]?  Office:  [phone]  Employed by - [employer]  Pager: [phone]

## 2024-02-11 ENCOUNTER — HEALTH MAINTENANCE LETTER (OUTPATIENT)
Age: 49
End: 2024-02-11

## 2025-02-08 ENCOUNTER — HEALTH MAINTENANCE LETTER (OUTPATIENT)
Age: 50
End: 2025-02-08

## 2025-03-08 ENCOUNTER — HEALTH MAINTENANCE LETTER (OUTPATIENT)
Age: 50
End: 2025-03-08